# Patient Record
Sex: MALE | Race: WHITE | URBAN - METROPOLITAN AREA
[De-identification: names, ages, dates, MRNs, and addresses within clinical notes are randomized per-mention and may not be internally consistent; named-entity substitution may affect disease eponyms.]

---

## 2017-01-03 ENCOUNTER — OFFICE VISIT (OUTPATIENT)
Dept: DERMATOLOGY | Facility: AMBULATORY SURGERY CENTER | Age: 82
End: 2017-01-03

## 2017-01-03 VITALS
OXYGEN SATURATION: 95 % | HEIGHT: 71 IN | HEART RATE: 55 BPM | TEMPERATURE: 97.9 F | RESPIRATION RATE: 16 BRPM | SYSTOLIC BLOOD PRESSURE: 164 MMHG | DIASTOLIC BLOOD PRESSURE: 108 MMHG

## 2017-01-03 DIAGNOSIS — L90.5 SCAR CONDITION AND FIBROSIS OF SKIN: ICD-10-CM

## 2017-01-03 DIAGNOSIS — Z87.2 HISTORY OF ACTINIC KERATOSES: ICD-10-CM

## 2017-01-03 DIAGNOSIS — D18.01 CHERRY ANGIOMA: ICD-10-CM

## 2017-01-03 DIAGNOSIS — L91.8 CUTANEOUS SKIN TAGS: ICD-10-CM

## 2017-01-03 DIAGNOSIS — L82.1 SEBORRHEIC KERATOSES: ICD-10-CM

## 2017-01-03 DIAGNOSIS — L21.9 SEBORRHEIC DERMATITIS: Primary | ICD-10-CM

## 2017-01-03 DIAGNOSIS — Z85.828 HISTORY OF NONMELANOMA SKIN CANCER: ICD-10-CM

## 2017-01-03 RX ORDER — OXYBUTYNIN CHLORIDE 15 MG/1
15 TABLET, EXTENDED RELEASE ORAL DAILY
COMMUNITY

## 2017-01-03 RX ORDER — TAMSULOSIN HYDROCHLORIDE 0.4 MG/1
0.4 CAPSULE ORAL DAILY
COMMUNITY

## 2017-01-03 NOTE — PROGRESS NOTES
Name: Nasrin Macias       Age: 80 y.o. Date: 1/3/2017    Chief Complaint:   Chief Complaint   Patient presents with    Skin Exam       Subjective:    HPI  Mr. Nasrin Macias is a 80 y.o. male who presents for a full skin exam.  The patient's last skin exam was one year ago and the patient does have current complaints related to his skin. He reports a scaly lesion on the scalp for about 1 year that will peel off at times. No bleeding or sensitivity. He reports brown lesions on the left lower neck and right chest that are a bit more prominent, a tag like lesion on the right hip - all present for many years. There is a scaly Mr. Nasrin Macias is feeling well and in his usual state of health today. The patient's pertinent skin history includes : BCC on the nose nearly 10 years ago and AK    ROS: Constitutional: Negative. Dermatological : positive for - skin lesion changes      Social History     Social History    Marital status:      Spouse name: N/A    Number of children: N/A    Years of education: N/A     Occupational History    Not on file. Social History Main Topics    Smoking status: Former Smoker    Smokeless tobacco: Never Used    Alcohol use Yes    Drug use: No    Sexual activity: Not on file     Other Topics Concern    Not on file     Social History Narrative       History reviewed. No pertinent family history. Past Medical History   Diagnosis Date    Essential hypertension     Joint replaced     Skin cancer     Sun-damaged skin        Past Surgical History   Procedure Laterality Date    Amb poc mohs 1 stage h/n/hf/g         Current Outpatient Prescriptions   Medication Sig Dispense Refill    tamsulosin (FLOMAX) 0.4 mg capsule Take 0.4 mg by mouth daily.  oxybutynin chloride XL (DITROPAN XL) 15 mg CR tablet Take 15 mg by mouth daily.  PSYLLIUM SEED, WITH SUGAR, (METAMUCIL PO) Take  by mouth.  Aspirin, Buffered 81 mg Tab Take  by mouth.       levothyroxine (LEVOXYL) 50 mcg tablet Take  by mouth Daily (before breakfast).  esomeprazole (NEXIUM) 40 mg capsule Take  by mouth daily.  metoprolol-XL (TOPROL XL) 50 mg XL tablet Take  by mouth daily.  irbesartan (AVAPRO) 150 mg tablet Take 150 mg by mouth nightly.  simvastatin (ZOCOR) 20 mg tablet Take  by mouth nightly.  darifenacin (ENABLEX) 15 mg ER tablet Take 15 mg by mouth daily.  acetaminophen (TYLENOL ARTHRITIS PAIN) 650 mg CR tablet Take 650 mg by mouth every six (6) hours as needed for Pain. Allergies   Allergen Reactions    Penicillins Unknown (comments)         Objective:    Visit Vitals    BP (!) 164/108 (BP 1 Location: Left arm, BP Patient Position: Sitting)    Pulse (!) 55    Temp 97.9 °F (36.6 °C) (Oral)    Resp 16    Ht 5' 11\" (1.803 m)    SpO2 95%       Norberto Roland is a 80 y.o. male who appears well and in no distress. He is awake, alert, and oriented. There is no preauricular, submandibular, or cervical lymphadenopathy. A skin examination was performed including his scalp, face (including eyelids), ears, neck, chest, back, abdomen, upper extremities (including digits/nails), lower extremities, breasts, buttocks; genital skin was not examined. There are cystic structures and comedones on each lateral canthus and a cyst on the left ear lobe. There is a well healed scar on the right ala without concern for lesion recurrence. There are numerous seborrheic keratoses, including many of his concerns and including a clonal SK on the medial left ankle. There are scattered cherry angiomas. There is a skin tag on the right hip. There is thickened scale on the scalp consistent with seborrheic dermatitis. Assessment/Plan:  1. Seborrheic Dermatitis. The diagnosis was discussed and over the counter recommendations were made for treatment. I suggest he try T-Sal shampoo. 2. Personal history of skin cancer.   I discussed sun protection, sunscreen use, the warning signs of skin cancer, the need for self-skin examinations, and the need for regular practitioner exams every 1 year. The patient should follow up sooner as needed if new, changing, or symptomatic skin lesions arise. 3. Seborrheic keratoses. The diagnosis was reviewed and the patient was reassured that no treatment is needed for these benign lesions. 4. Cherry angiomas. The diagnosis was reviewed and the patient was reassured that no treatment is needed for these benign lesions. 5. Skin tag. Patient reassured.

## 2017-01-03 NOTE — PROGRESS NOTES
Mr. Bard Mccauley comes in for follow-up. He is an 51-year-old male with a history of basal cell carcinoma on his nose treated more than 8 years ago. He is aware of dry skin on his scalp, he uses bar soap in place of shampoo. He has a rough bump on his right hand present for 2 months. He notices several dry scaly patches on his chest and right arm. He is feeling well and in his usual state of health today. He has no pain, no current illnesses, no other skin concerns. His allergies medications medical and social history are reviewed by me today. I have reviewed the history, physical exam, assessment and plan by Beto Ferrera NP and agree. He is awake alert well-appearing male in no distress. There is no preauricular, submandibular, or cervical lymphadenopathy. I examined his scalp face years neck chest back abdomen arms hands and anterior lower extremities from thighs to feet. He has well healed scar on his nose, no evidence of recurrence of treated basal cell carcinoma. He has an actinic keratosis on his right dorsal hand. He has moderate seborrheic keratitis of the scalp with adherent yellowish scale. He has a lipoma left abdomen. The rough lesions he had noticed are seborrheic keratoses on his right mid chest and his right upper outer arm. Each diagnosis was discussed with him. He is well-healed from surgery for basal cell carcinoma without evidence for recurrence. Actinic keratosis on his right dorsal hand was treated with cryotherapy. He was reassured regarding seborrheic keratoses and lipoma. Strategies to reduce scaling on his scalp were discussed, he understands. He will follow-up at least annually for surveillance due to his history of skin cancer. Warning signs of skin cancer were discussed.

## 2018-04-18 ENCOUNTER — OFFICE VISIT (OUTPATIENT)
Dept: DERMATOLOGY | Facility: AMBULATORY SURGERY CENTER | Age: 83
End: 2018-04-18

## 2018-04-18 VITALS
WEIGHT: 220 LBS | OXYGEN SATURATION: 60 % | HEIGHT: 71 IN | DIASTOLIC BLOOD PRESSURE: 98 MMHG | HEART RATE: 95 BPM | TEMPERATURE: 98.1 F | RESPIRATION RATE: 16 BRPM | BODY MASS INDEX: 30.8 KG/M2 | SYSTOLIC BLOOD PRESSURE: 148 MMHG

## 2018-04-18 DIAGNOSIS — Z85.828 HISTORY OF NONMELANOMA SKIN CANCER: Primary | ICD-10-CM

## 2018-04-18 DIAGNOSIS — L70.0 COMEDONE: ICD-10-CM

## 2018-04-18 DIAGNOSIS — L81.4 LENTIGINES: ICD-10-CM

## 2018-04-18 DIAGNOSIS — Q82.5 SALMON PATCH NEVUS: ICD-10-CM

## 2018-04-18 DIAGNOSIS — L82.1 OTHER SEBORRHEIC KERATOSIS: ICD-10-CM

## 2018-04-18 DIAGNOSIS — L72.9 CYST OF SKIN: ICD-10-CM

## 2018-04-18 DIAGNOSIS — L57.0 ACTINIC KERATOSIS: ICD-10-CM

## 2018-04-18 DIAGNOSIS — L21.9 SEBORRHEIC DERMATITIS: ICD-10-CM

## 2018-04-18 DIAGNOSIS — L91.8 SKIN TAG: ICD-10-CM

## 2018-04-18 DIAGNOSIS — D18.01 CHERRY ANGIOMA: ICD-10-CM

## 2018-04-18 RX ORDER — FINASTERIDE 5 MG/1
TABLET, FILM COATED ORAL
COMMUNITY
Start: 2018-02-19

## 2018-04-18 NOTE — MR AVS SNAPSHOT
[address] [facility] [address] 
[phone] Patient: Lucila Bhakta MRN: [id number] Visit Information Date & Time Provider Department Dept. Phone Encounter #  
 4/18/2018  2:30 PM RAVEN Jiniraabiel [id number] [phone] Upcoming Health Maintenance Date Due DTaP/Tdap/Td series (1 - Tdap) 7/29/1954 ZOSTER VACCINE AGE 60> 5/29/1993 GLAUCOMA SCREENING Q2Y 7/29/1998 Pneumococcal 65+ Low/Medium Risk (1 of 2 - PCV13) 7/29/1998 Influenza Age 5 to Adult 8/1/2017 MEDICARE YEARLY EXAM 3/14/2018 Allergies as of 4/18/2018  Review Complete On: 4/18/2018 By: Fang Isaac LPN Severity Noted Reaction Type Reactions Cortisone    Other (comments) Penicillins  11/16/2012    Unknown (comments) Current Immunizations  Never Reviewed No immunizations on file. Not reviewed this visit Vitals BP Pulse Temp Resp Height(growth percentile) Weight(growth percentile) (!) 148/98 (BP 1 Location: Right arm, BP Patient Position: Sitting) 95 98.1 °F (36.7 °C) (Oral) 16 5' 11\" (1.803 m) 220 lb (99.8 kg) SpO2 BMI Smoking Status (!) 60% 30.68 kg/m2 Former Smoker BMI and BSA Data Body Mass Index Body Surface Area  
 30.68 kg/m 2 2.24 m 2 Preferred Pharmacy Pharmacy Name Phone [address] [facility] [phone] Your Updated Medication List  
  
   
This list is accurate as of 4/18/18  2:30 PM.  Always use your most recent med list.  
  
  
  
  
 aspirin, buffered 81 mg Tab Take  by mouth. AVAPRO 150 mg tablet Generic drug:  irbesartan Take 300 mg by mouth nightly. finasteride 5 mg tablet Commonly known as:  PROSCAR  
  
 LEVOXYL 50 mcg tablet Generic drug:  levothyroxine Take  by mouth Daily (before breakfast). NexIUM 40 mg capsule Generic drug:  esomeprazole Take  by mouth daily. oxybutynin chloride XL 15 mg CR tablet Commonly known as:  DITROPAN XL Take 15 mg by mouth daily. SYNOVACIN PO  
1500 mg and Chondroitin 1200, 2 tablets, daily  
  
 tamsulosin 0.4 mg capsule Commonly known as:  FLOMAX Take 0.4 mg by mouth daily. TOPROL XL 50 mg XL tablet Generic drug:  metoprolol succinate Take 25 mg by mouth daily. TYLENOL ARTHRITIS PAIN 650 mg Charlcie Persons Generic drug:  acetaminophen Take 650 mg by mouth every six (6) hours as needed for Pain. ZOCOR 20 mg tablet Generic drug:  simvastatin Take  by mouth nightly. Patient Instructions Self Skin Exam and Sunscreens Early detection and treatment is essential in the treatment of all forms of skin cancer. If caught early, all forms of skin cancer are curable. In addition to your regular visits, you should perform a monthly skin examination. Over time, you become familiar with what is normally found on your skin and can identify new or suspicious spots. One of the screening tools you can use to assess your skin is to follow the ABCDEs: 
 
A= Asymmetry (One half is unlike the other half) B= Border (An irregular, scalloped or poorly defined edge) C= Color (Is varied from one area to another, has shades of tan, brown/ black,       white, red or blue) D= Diameter (Spots larger than 6mm or a pencil eraser) E= Evolving (New spots or one that is changing in size, shape, or color) A follow- up interval will be customized based on your history of skin cancer or level of skin damage and risk factors. In any case, if you notice a suspicious or new spot, an appointment should be arranged between regular visits. Everyone should use sunscreen and sun-safe practices, which is especially important for those with a personal or family history of skin cancer. Suggestions for this include: 1. Use daily moisturizers containing SPF 30 or higher. 2. Wear long sleeve clothing with UPF ratings and a broad-brimmed hat. 3. Apply sunscreen with SPF 30 or higher to all sun exposed areas if you are going to be in the sun. A broad spectrum UVA/ UVB sunscreen is best.  Dont forget to REAPPLY every two hours or more often if swimming or sweating! 4. Avoid outside activities during peak sun hours, especially in the summer (10am- 2pm). 5. DO NOT use tanning beds. Using sunscreen and sun-safe practices can help reduce the likelihood of developing skin cancer or additional skin cancers in those previously diagnosed. Introducing [facility]! [facility] introduces Pain Doctor patient portal. Now you can access parts of your medical record, email your doctor's office, and request medication refills online. 1. In your internet browser, go to [email or web address] 2. Click on the First Time User? Click Here link in the Sign In box. You will see the New Member Sign Up page. 3. Enter your Pain Doctor Access Code exactly as it appears below. You will not need to use this code after youve completed the sign-up process. If you do not sign up before the expiration date, you must request a new code. · Pain Doctor Access Code: [email or web address] Expires: 7/17/2018  2:30 PM 
 
4. Enter the last four digits of your Social Security Number (xxxx) and Date of Birth (mm/dd/yyyy) as indicated and click Submit. You will be taken to the next sign-up page. 5. Create a [employer] ID. This will be your Pain Doctor login ID and cannot be changed, so think of one that is secure and easy to remember. 6. Create a Pain Doctor password. You can change your password at any time. 7. Enter your Password Reset Question and Answer. This can be used at a later time if you forget your password. 8. Enter your e-mail address. You will receive e-mail notification when new information is available in [address] 9. Click Sign Up. You can now view and download portions of your medical record. 10. Click the Download Summary menu link to download a portable copy of your medical information. If you have questions, please visit the Frequently Asked Questions section of the [email or web address] website. Remember, [email or web address] is NOT to be used for urgent needs. For medical emergencies, dial 911. Now available from your iPhone and Android! Please provide this summary of care documentation to your next provider. If you have any questions after today's visit, please call [phone].

## 2018-04-18 NOTE — PROGRESS NOTES
Written by Damon Mckinney, as dictated by Nicole Noland, [address]. Name: Carl Soriano       Age: 80 y.o. Date: 4/18/2018    Chief Complaint:   Chief Complaint   Patient presents with    Skin Exam       Subjective:    HPI  Mr. Carl Soriano is a 80 y.o. male who presents for a full skin exam.  The patient's last skin exam was on 1/3/17 and the patient does have current complaints related to his skin. He reports cyst-like lesions around his eyes and on his left lower cheek. He reports draining malodorous material from his left lower cheek. The patient's pertinent skin history includes : BCC on the nose treated 11 years ago; AKs    ROS: Constitutional: Negative. Dermatological : positive for - skin lesion changes      Social History     Social History    Marital status:      Spouse name: N/A    Number of children: N/A    Years of education: N/A     Occupational History    Not on file. Social History Main Topics    Smoking status: Former Smoker    Smokeless tobacco: Never Used    Alcohol use Yes    Drug use: No    Sexual activity: Not on file     Other Topics Concern    Not on file     Social History Narrative       History reviewed. No pertinent family history. Past Medical History:   Diagnosis Date    Essential hypertension     Joint replaced     Skin cancer     Sun-damaged skin        Past Surgical History:   Procedure Laterality Date    AMB POC MOHS 1 STAGE H/N/HF/G         Current Outpatient Prescriptions   Medication Sig Dispense Refill    finasteride (PROSCAR) 5 mg tablet       GLUCOSAMINE SULFATE (SYNOVACIN PO) 1500 mg and Chondroitin 1200, 2 tablets, daily      tamsulosin (FLOMAX) 0.4 mg capsule Take 0.4 mg by mouth daily.  oxybutynin chloride XL (DITROPAN XL) 15 mg CR tablet Take 15 mg by mouth daily.  Aspirin, Buffered 81 mg Tab Take  by mouth.       acetaminophen (TYLENOL ARTHRITIS PAIN) 650 mg CR tablet Take 650 mg by mouth every six (6) hours as needed for Pain.  levothyroxine (LEVOXYL) 50 mcg tablet Take  by mouth Daily (before breakfast).  esomeprazole (NEXIUM) 40 mg capsule Take  by mouth daily.  metoprolol-XL (TOPROL XL) 50 mg XL tablet Take 25 mg by mouth daily.  irbesartan (AVAPRO) 150 mg tablet Take 300 mg by mouth nightly.  simvastatin (ZOCOR) 20 mg tablet Take  by mouth nightly. Allergies   Allergen Reactions    Cortisone Other (comments)    Penicillins Unknown (comments)         Objective:    Visit Vitals    BP (!) 148/98 (BP 1 Location: Right arm, BP Patient Position: Sitting)    Pulse 95    Temp 98.1 °F (36.7 °C) (Oral)    Resp 16    Ht 5' 11\" (1.803 m)    Wt 220 lb (99.8 kg)    SpO2 (!) 60%    BMI 30.68 kg/m2       Leilani Rich is a 80 y.o. male who appears well and in no distress. He is awake, alert, and oriented. There is no preauricular, submandibular, or cervical lymphadenopathy. A skin examination was performed including his scalp, face (including eyelids), ears, neck, chest, back, abdomen, upper extremities (including digits/nails), lower extremities, breasts, buttocks; genital skin was not examined. He has a well healed surgical site on his nose without evidence of lesion recurrence. There are scattered waxy macules and keratotic papules consistent with seborrheic keratoses. He has scattered red papules consistent with cherry angiomas. He has scattered skin tags. He has cysts on his eyelids and left lower cheek. He has comedones on his face and back. He has thin scaled actinic keratoses on his forehead and nose. There are lentigines on sun exposed areas. He has a salmon patch on his posterior neck. Assessment/Plan:    1. Personal history of nonmelanoma skin cancer. I discussed sun protection, sunscreen use, the warning signs of skin cancer, the need for self-skin examinations, and the need for regular practitioner exams every 1 year.   The patient should follow up sooner as needed if new, changing, or symptomatic skin lesions arise. 2. Seborrheic keratoses. The diagnosis was reviewed and the patient was reassured that no treatment is needed for these benign lesions. 3. Seborrheic Dermatitis, scalp. Doing well, continue OTC medications. 4. Cherry angiomas. The diagnosis was reviewed and the patient was reassured that no treatment is needed for these benign lesions. 5. Skin tags. The diagnosis was discussed. The patient was reassured that no treatment is necessary at this time. 6. Cysts, face. The diagnosis was discussed. Verbal consent was obtained. I drained the lesions (x5) and he tolerated this well. 7. Open comedones. The diagnosis was discussed and the patient desires removal. After verbal permission, the area was cleansed with Alcohol and the material successfully extracted with a comedone extractor. 8. Actinic Keratoses. The diagnosis of this precancerous lesion related to sun exposure was reviewed. Verbal consent was obtained. I treated 4 lesions with cryotherapy and post-cryotherapy care was reviewed. 9. Solar lentigos. The diagnosis and relationship to sun exposure was reviewed. Sun protection advised. 10. Wishon patch, posterior neck. The diagnosis was discussed. This plan was reviewed with the patient and patient agrees. All questions were answered. This scribe documentation was reviewed by me and accurately reflects the examination and decisions made by me. [facility]   OFFICE PROCEDURE PROGRESS NOTE   Chart reviewed for the following:   Evan ELDRIDGE, have reviewed the History, Physical and updated the Allergic reactions for [employer] TIME OUT performed immediately prior to start of procedure:   Pam ELDRIDGE, have performed the following reviews on [employer]   prior to the start of the procedure:     * Patient was identified by name and date of birth * Agreement on procedure being performed was verified   * Risks and Benefits explained to the patient   * Procedure site verified and marked as necessary   * Patient was positioned for comfort   * Verbal consent was obtained    Time: 2:55 PM   Date of procedure: 4/18/2018  Procedure performed by: Shivani Pereira.  Hanane Wooten DNP  Provider assisted by: self   Patient assisted by: self   How tolerated by patient: tolerated the procedure well with no complications   Comments: none

## 2019-04-23 ENCOUNTER — OFFICE VISIT (OUTPATIENT)
Dept: DERMATOLOGY | Facility: AMBULATORY SURGERY CENTER | Age: 84
End: 2019-04-23

## 2019-04-23 VITALS
DIASTOLIC BLOOD PRESSURE: 76 MMHG | RESPIRATION RATE: 18 BRPM | HEART RATE: 71 BPM | BODY MASS INDEX: 30.8 KG/M2 | WEIGHT: 220 LBS | OXYGEN SATURATION: 96 % | SYSTOLIC BLOOD PRESSURE: 118 MMHG | TEMPERATURE: 98.1 F | HEIGHT: 71 IN

## 2019-04-23 DIAGNOSIS — L57.0 ACTINIC KERATOSES: Primary | ICD-10-CM

## 2019-04-23 DIAGNOSIS — L82.1 SEBORRHEIC KERATOSES: ICD-10-CM

## 2019-04-23 DIAGNOSIS — D18.01 CHERRY ANGIOMA: ICD-10-CM

## 2019-04-23 DIAGNOSIS — L81.4 LENTIGINES: ICD-10-CM

## 2019-04-23 DIAGNOSIS — L72.0 EPIDERMAL INCLUSION CYST: ICD-10-CM

## 2019-04-23 DIAGNOSIS — D17.22 LIPOMA OF LEFT FOREARM: ICD-10-CM

## 2019-04-23 DIAGNOSIS — Z85.828 HISTORY OF NONMELANOMA SKIN CANCER: ICD-10-CM

## 2019-04-23 DIAGNOSIS — R20.2 NOTALGIA PARESTHETICA: ICD-10-CM

## 2019-04-23 RX ORDER — HYDROCHLOROTHIAZIDE 12.5 MG/1
12.5 CAPSULE ORAL DAILY
COMMUNITY

## 2019-04-23 NOTE — PROGRESS NOTES
Written by Ellie Gonzalez, as dictated by Mardeen Player Lennard Carrel, [address]. Name: Joycelyn Hernandez       Age: 80 y.o. Date: 4/23/2019    Chief Complaint:   Chief Complaint   Patient presents with    Skin Exam     est pt annual       Subjective:    HPI  Mr. Joycelyn Hernandez is a 80 y.o. male who presents for a full skin exam.  The patient's last skin exam was on 04/18/18 and the patient does have current complaints related to his skin. He reports an inflamed lesion on the left flank that is slightly tender. He also reports a scaly and rough lesion on the left ear. He also notes an area on the mid back that is very itchy. He is feeling well and in his usual state of health today. He has no current illnesses, no other skin concerns. His allergies, medications, medical, and social history are reviewed by me today. The patient's pertinent skin history includes : personal history of NMSC and AKs  -BCC, right nasal sidewall, Mohs, 06/03/10    ROS: Constitutional: Negative. Dermatological : positive for - skin lesion changes    Social History     Socioeconomic History    Marital status:      Spouse name: Not on file    Number of children: Not on file    Years of education: Not on file    Highest education level: Not on file   Occupational History    Not on file   Social Needs    Financial resource strain: Not on file    Food insecurity:     Worry: Not on file     Inability: Not on file    Transportation needs:     Medical: Not on file     Non-medical: Not on file   Tobacco Use    Smoking status: Former Smoker    Smokeless tobacco: Never Used   Substance and Sexual Activity    Alcohol use:  Yes    Drug use: No    Sexual activity: Not on file   Lifestyle    Physical activity:     Days per week: Not on file     Minutes per session: Not on file    Stress: Not on file   Relationships    Social connections:     Talks on phone: Not on file     Gets together: Not on file     Attends Taoist service: Not on file     Active member of club or organization: Not on file     Attends meetings of clubs or organizations: Not on file     Relationship status: Not on file    Intimate partner violence:     Fear of current or ex partner: Not on file     Emotionally abused: Not on file     Physically abused: Not on file     Forced sexual activity: Not on file   Other Topics Concern    Not on file   Social History Narrative    Not on file       History reviewed. No pertinent family history. Past Medical History:   Diagnosis Date    Essential hypertension     Joint replaced     Skin cancer     Sun-damaged skin        Past Surgical History:   Procedure Laterality Date    AMB POC MOHS 1 STAGE H/N/HF/G         Current Outpatient Medications   Medication Sig Dispense Refill    hydroCHLOROthiazide (MICROZIDE) 12.5 mg capsule Take 12.5 mg by mouth daily.  finasteride (PROSCAR) 5 mg tablet       GLUCOSAMINE SULFATE (SYNOVACIN PO) 1500 mg and Chondroitin 1200, 2 tablets, daily      tamsulosin (FLOMAX) 0.4 mg capsule Take 0.4 mg by mouth daily.  oxybutynin chloride XL (DITROPAN XL) 15 mg CR tablet Take 15 mg by mouth daily.  Aspirin, Buffered 81 mg Tab Take  by mouth.  acetaminophen (TYLENOL ARTHRITIS PAIN) 650 mg CR tablet Take 650 mg by mouth every six (6) hours as needed for Pain.  levothyroxine (LEVOXYL) 50 mcg tablet Take  by mouth Daily (before breakfast).  esomeprazole (NEXIUM) 40 mg capsule Take  by mouth daily.  metoprolol-XL (TOPROL XL) 50 mg XL tablet Take 25 mg by mouth daily.  irbesartan (AVAPRO) 150 mg tablet Take 300 mg by mouth nightly.  simvastatin (ZOCOR) 20 mg tablet Take  by mouth nightly.            Allergies   Allergen Reactions    Cortisone Other (comments)    Penicillins Unknown (comments)         Objective:    Visit Vitals  /76 (BP 1 Location: Right arm, BP Patient Position: Sitting)   Pulse 71   Temp 98.1 °F (36.7 °C) (Oral)   Resp 18   Ht 5' 11\" (1.803 m)   Wt 220 lb (99.8 kg)   SpO2 96%   BMI 30.68 kg/m²       Higinio Camp is a 80 y.o. male who appears well and in no distress. He is awake, alert, and oriented. There is no preauricular, submandibular, or cervical lymphadenopathy. A skin examination was performed including his scalp, face (including eyelids), ears, neck, chest, back, abdomen, upper extremities (including digits/nails), lower extremities, breasts, buttocks; genital skin was not examined. There is a well-healed scar on the right nasal sidewall without evidence of lesion recurrence. There are thin-scaled actinic keratoses on the left postauricular, left helix, and right cheek. The lesion of concern on the left flank is a cystic structure with punctum consistent with an epidermal inclusion cyst. There is also an epidermal inclusion cyst on the left ear. He has scattered waxy macules and keratotic papules consistent with seborrheic keratoses. He has has scattered red papules consistent with cherry angiomas. There are lentigines on sun exposed areas. There is no definitive lesion, rash, or concerning features in the area of itching on the mid back, consistent with notalgia paresthetica. There is a rubbery subcutaneous nodule on the left forearm consistent with a lipoma. Assessment/Plan:    1. Personal history of nonmelanoma skin cancer. I discussed sun protection, sunscreen use, the warning signs of skin cancer, the need for self-skin examinations, and the need for regular practitioner exams every 1 year. The patient should follow up sooner as needed if new, changing, or symptomatic skin lesions arise. 2. Actinic Keratoses. The diagnosis of this precancerous lesion related to sun exposure was reviewed. Verbal consent was obtained. I treated 3 lesions with cryotherapy and post-cryotherapy care was reviewed. 3. Epidermal inclusion cysts.  The diagnosis was discussed and the patient desires removal on the left flank. After verbal permission, the area on the left flank was cleansed with Alcohol and the material successfully extracted with a comedone extractor. 4. Seborrheic keratoses. The diagnosis was reviewed and the patient was reassured that no treatment is needed for these benign lesions. 5. Cherry angiomas. The diagnosis was reviewed and the patient was reassured that no treatment is needed for these benign lesions. 6. Solar lentigos. The diagnosis and relationship to sun exposure was reviewed. Sun protection advised. 7. Notalgia paresthetica. No rash or lesion in the area of itching. Over the counter anti-itch creams recommended. 8. Lipoma, left forearm. The diagnosis was discussed. The patient was reassured that no treatment is necessary at this time. This plan was reviewed with the patient and patient agrees. All questions were answered. This scribe documentation was reviewed by me and accurately reflects the examination and decisions made by me. [facility]   OFFICE PROCEDURE PROGRESS NOTE   Chart reviewed for the following:   Evan ELDRIDGE, have reviewed the History, Physical and updated the Allergic reactions for [employer]. TIME OUT performed immediately prior to start of procedure:   Pam ELDRIDGE, have performed the following reviews on [employer]   prior to the start of the procedure:     * Patient was identified by name and date of birth   * Agreement on procedure being performed was verified   * Risks and Benefits explained to the patient   * Procedure site verified and marked as necessary   * Patient was positioned for comfort   * Consent was signed and verified     Time: 3:15 PM  Date of procedure: 4/23/2019  Procedure performed by: Yelitza Galdamez DNP  Provider assisted by: self   Patient assisted by: self   How tolerated by patient: tolerated the procedure well with no complications   Comments: none

## 2019-04-23 NOTE — PROGRESS NOTES
Chief Complaint   Patient presents with    Skin Exam     est pt annual     Pt states he has a spot left ear, left side and right back upper shoulder. 1. Have you been to the ER, urgent care clinic since your last visit? Hospitalized since your last visit? No    2. Have you seen or consulted any other health care providers outside of the [address] [place] [address] since your last visit? Include any pap smears or colon screening.  No    Visit Vitals  /76 (BP 1 Location: Right arm, BP Patient Position: Sitting)   Pulse 71   Temp 98.1 °F (36.7 °C) (Oral)   Resp 18   Ht 5' 11\" (1.803 m)   Wt 99.8 kg (220 lb)   SpO2 96%   BMI 30.68 kg/m²

## 2023-05-18 RX ORDER — METOPROLOL SUCCINATE 50 MG/1
25 TABLET, EXTENDED RELEASE ORAL DAILY
COMMUNITY

## 2023-05-18 RX ORDER — SENNOSIDES 8.6 MG
650 CAPSULE ORAL EVERY 6 HOURS PRN
COMMUNITY

## 2023-05-18 RX ORDER — SIMVASTATIN 20 MG
TABLET ORAL
COMMUNITY

## 2023-05-18 RX ORDER — TAMSULOSIN HYDROCHLORIDE 0.4 MG/1
0.4 CAPSULE ORAL DAILY
COMMUNITY

## 2023-05-18 RX ORDER — IRBESARTAN 150 MG/1
300 TABLET ORAL NIGHTLY
COMMUNITY

## 2023-05-18 RX ORDER — ESOMEPRAZOLE MAGNESIUM 40 MG/1
CAPSULE, DELAYED RELEASE ORAL DAILY
COMMUNITY

## 2023-05-18 RX ORDER — HYDROCHLOROTHIAZIDE 12.5 MG/1
12.5 CAPSULE, GELATIN COATED ORAL DAILY
COMMUNITY

## 2023-05-18 RX ORDER — OXYBUTYNIN CHLORIDE 15 MG/1
15 TABLET, EXTENDED RELEASE ORAL DAILY
COMMUNITY

## 2023-05-18 RX ORDER — LEVOTHYROXINE SODIUM 0.05 MG/1
TABLET ORAL
COMMUNITY

## 2023-05-18 RX ORDER — FINASTERIDE 5 MG/1
TABLET, FILM COATED ORAL
COMMUNITY
Start: 2018-02-19